# Patient Record
Sex: MALE | Employment: OTHER
[De-identification: names, ages, dates, MRNs, and addresses within clinical notes are randomized per-mention and may not be internally consistent; named-entity substitution may affect disease eponyms.]

---

## 2020-11-01 ENCOUNTER — OFFICE VISIT (OUTPATIENT)
Age: 75
End: 2020-11-01

## 2021-04-08 ENCOUNTER — OFFICE VISIT (OUTPATIENT)
Age: 76
End: 2021-04-08

## 2021-04-29 ENCOUNTER — TELEPHONE ENCOUNTER (OUTPATIENT)
Dept: URBAN - METROPOLITAN AREA CLINIC 9 | Facility: CLINIC | Age: 76
End: 2021-04-29

## 2021-04-29 ENCOUNTER — OFFICE VISIT (OUTPATIENT)
Dept: URBAN - METROPOLITAN AREA CLINIC 7 | Facility: CLINIC | Age: 76
End: 2021-04-29

## 2021-04-30 ENCOUNTER — OFFICE VISIT (OUTPATIENT)
Dept: URBAN - METROPOLITAN AREA SURGERY CENTER 5 | Facility: SURGERY CENTER | Age: 76
End: 2021-04-30

## 2021-05-03 ENCOUNTER — TELEPHONE ENCOUNTER (OUTPATIENT)
Dept: URBAN - METROPOLITAN AREA CLINIC 9 | Facility: CLINIC | Age: 76
End: 2021-05-03

## 2021-05-07 ENCOUNTER — OFFICE VISIT (OUTPATIENT)
Dept: URBAN - METROPOLITAN AREA SURGERY CENTER 5 | Facility: SURGERY CENTER | Age: 76
End: 2021-05-07

## 2021-06-02 ENCOUNTER — OFFICE VISIT (OUTPATIENT)
Dept: URBAN - METROPOLITAN AREA SURGERY CENTER 5 | Facility: SURGERY CENTER | Age: 76
End: 2021-06-02

## 2022-07-30 ENCOUNTER — TELEPHONE ENCOUNTER (OUTPATIENT)
Age: 77
End: 2022-07-30

## 2022-07-30 RX ORDER — SODIUM PHOSPHATE,MONO-DIBASIC 19G-7G/118
1 (ONE) ENEMA (ML) RECTAL DAILY
Qty: 0 | Refills: 2 | OUTPATIENT
Start: 2017-12-04 | End: 2017-12-05

## 2022-07-30 RX ORDER — DICYCLOMINE HYDROCHLORIDE 10 MG/1
1-2 CAPSULE CAPSULE ORAL
Qty: 0 | Refills: 16 | OUTPATIENT
Start: 2014-01-20 | End: 2014-12-02

## 2022-07-31 ENCOUNTER — TELEPHONE ENCOUNTER (OUTPATIENT)
Age: 77
End: 2022-07-31

## 2022-07-31 RX ORDER — DICYCLOMINE HYDROCHLORIDE 10 MG/1
1-2 CAPSULE ORAL
Qty: 0 | Refills: 16 | Status: ACTIVE | COMMUNITY
Start: 2014-01-20

## 2022-07-31 RX ORDER — SODIUM PHOSPHATE,MONO-DIBASIC 19G-7G/118
1 (ONE) ENEMA (ML) RECTAL DAILY
Qty: 0 | Refills: 2 | Status: ACTIVE | COMMUNITY
Start: 2017-12-04

## 2023-01-13 ENCOUNTER — OFFICE VISIT (OUTPATIENT)
Dept: URBAN - METROPOLITAN AREA CLINIC 7 | Facility: CLINIC | Age: 78
End: 2023-01-13
Payer: MEDICARE

## 2023-01-13 ENCOUNTER — WEB ENCOUNTER (OUTPATIENT)
Dept: URBAN - METROPOLITAN AREA CLINIC 7 | Facility: CLINIC | Age: 78
End: 2023-01-13

## 2023-01-13 ENCOUNTER — LAB OUTSIDE AN ENCOUNTER (OUTPATIENT)
Dept: URBAN - METROPOLITAN AREA CLINIC 7 | Facility: CLINIC | Age: 78
End: 2023-01-13

## 2023-01-13 VITALS
DIASTOLIC BLOOD PRESSURE: 72 MMHG | WEIGHT: 165 LBS | TEMPERATURE: 98.7 F | HEIGHT: 71 IN | SYSTOLIC BLOOD PRESSURE: 144 MMHG | BODY MASS INDEX: 23.1 KG/M2

## 2023-01-13 DIAGNOSIS — K58.9 IRRITABLE BOWEL SYNDROME: ICD-10-CM

## 2023-01-13 DIAGNOSIS — R10.13 EPIGASTRIC PAIN: ICD-10-CM

## 2023-01-13 DIAGNOSIS — K21.9 GASTROESOPHAGEAL REFLUX DISEASE: ICD-10-CM

## 2023-01-13 DIAGNOSIS — K57.92 DIVERTICULITIS: ICD-10-CM

## 2023-01-13 DIAGNOSIS — Z86.010 PERSONAL HISTORY OF COLONIC POLYPS: ICD-10-CM

## 2023-01-13 DIAGNOSIS — Z87.11 H/O PEPTIC ULCER: ICD-10-CM

## 2023-01-13 PROCEDURE — 99214 OFFICE O/P EST MOD 30 MIN: CPT | Performed by: STUDENT IN AN ORGANIZED HEALTH CARE EDUCATION/TRAINING PROGRAM

## 2023-01-13 RX ORDER — DICYCLOMINE HYDROCHLORIDE 10 MG/1
1-2 CAPSULE ORAL
Qty: 0 | Refills: 16 | Status: DISCONTINUED | COMMUNITY
Start: 2014-01-20

## 2023-01-13 RX ORDER — SODIUM PHOSPHATE,MONO-DIBASIC 19G-7G/118
1 (ONE) ENEMA (ML) RECTAL DAILY
Qty: 0 | Refills: 2 | Status: DISCONTINUED | COMMUNITY
Start: 2017-12-04

## 2023-01-13 RX ORDER — PANTOPRAZOLE SODIUM 40 MG/1
1 TABLET TABLET, DELAYED RELEASE ORAL ONCE A DAY
Status: ACTIVE | COMMUNITY

## 2023-01-13 NOTE — HPI-TODAY'S VISIT:
76 YO Male presents for chronic acid reflux.  Has bene going on for 3-4 weeks, not responsive to PPI.  Has been having esophageal pain, occasional dysphagia.  Previous history of colon resection due to perforated colon from diverticulosis, s/p reversal.  Will schedule EGD with possible dilation for epigastric pain and dysphagia.    ALARM SYMPTOMS --No odynophagia --No hematemesis --No melena / hematochezia --No unintentional weight loss --No iron deficiency anemia  PERTINENT MEDICAL HISTORY Aspirin 81mg PO daily:   --Not Taking Other Blood Thinners:   --Not Taking Diabetes Medication:   --No History  Cardiac Disease:   --No History  Pulmonary Disease --No History  Abdominal Surgery & Hernia:  Colon resection, perforated diverticulum  PERTINENT GI FAMILY HISTORY Colon polyps:  no family history Colon cancer:  no family history   GASTROINTESTINAL PROCEDURE HISTORY Colonoscopy:	 --2021  EGD:   --2021

## 2023-01-17 PROBLEM — 79922009: Status: ACTIVE | Noted: 2023-01-13

## 2023-01-20 ENCOUNTER — CLAIMS CREATED FROM THE CLAIM WINDOW (OUTPATIENT)
Dept: URBAN - METROPOLITAN AREA SURGERY CENTER 5 | Facility: SURGERY CENTER | Age: 78
End: 2023-01-20
Payer: MEDICARE

## 2023-01-20 ENCOUNTER — CLAIMS CREATED FROM THE CLAIM WINDOW (OUTPATIENT)
Dept: URBAN - METROPOLITAN AREA CLINIC 4 | Facility: CLINIC | Age: 78
End: 2023-01-20
Payer: MEDICARE

## 2023-01-20 DIAGNOSIS — R10.13 ABDOMINAL DISCOMFORT, EPIGASTRIC: ICD-10-CM

## 2023-01-20 DIAGNOSIS — K29.70 CHRONIC ACITVE GASTRITIS (H.PYLORI NEGATIVE): ICD-10-CM

## 2023-01-20 DIAGNOSIS — R13.10 ABNORMAL DEGLUTITION: ICD-10-CM

## 2023-01-20 DIAGNOSIS — K22.2 ACQUIRED ESOPHAGEAL RING: ICD-10-CM

## 2023-01-20 DIAGNOSIS — K20.90 ACUTE ESOPHAGITIS: ICD-10-CM

## 2023-01-20 DIAGNOSIS — K31.89 OTHER DISEASES OF STOMACH AND DUODENUM: ICD-10-CM

## 2023-01-20 PROCEDURE — 43248 EGD GUIDE WIRE INSERTION: CPT | Performed by: STUDENT IN AN ORGANIZED HEALTH CARE EDUCATION/TRAINING PROGRAM

## 2023-01-20 PROCEDURE — 43239 EGD BIOPSY SINGLE/MULTIPLE: CPT | Performed by: STUDENT IN AN ORGANIZED HEALTH CARE EDUCATION/TRAINING PROGRAM

## 2023-01-20 PROCEDURE — 88305 TISSUE EXAM BY PATHOLOGIST: CPT | Performed by: PATHOLOGY

## 2023-01-20 RX ORDER — PANTOPRAZOLE SODIUM 40 MG/1
1 TABLET TABLET, DELAYED RELEASE ORAL ONCE A DAY
Status: ACTIVE | COMMUNITY

## 2023-02-01 ENCOUNTER — TELEPHONE ENCOUNTER (OUTPATIENT)
Dept: URBAN - METROPOLITAN AREA CLINIC 7 | Facility: CLINIC | Age: 78
End: 2023-02-01

## 2023-02-01 RX ORDER — FAMOTIDINE 40 MG/1
1 TABLET AT BEDTIME TABLET, FILM COATED ORAL ONCE A DAY
Qty: 30 | Refills: 3 | OUTPATIENT

## 2023-02-01 RX ORDER — PANTOPRAZOLE SODIUM 40 MG/1
1 TABLET TABLET, DELAYED RELEASE ORAL ONCE A DAY
Status: ACTIVE | COMMUNITY

## 2023-02-27 ENCOUNTER — TELEPHONE ENCOUNTER (OUTPATIENT)
Dept: URBAN - METROPOLITAN AREA CLINIC 9 | Facility: CLINIC | Age: 78
End: 2023-02-27

## 2023-02-27 RX ORDER — PANTOPRAZOLE SODIUM 40 MG/1
1 TABLET TABLET, DELAYED RELEASE ORAL ONCE A DAY
Qty: 30 | Refills: 3

## 2023-03-06 ENCOUNTER — OFFICE VISIT (OUTPATIENT)
Dept: URBAN - METROPOLITAN AREA CLINIC 7 | Facility: CLINIC | Age: 78
End: 2023-03-06
Payer: MEDICARE

## 2023-03-06 VITALS
HEIGHT: 71 IN | TEMPERATURE: 98 F | BODY MASS INDEX: 22.96 KG/M2 | DIASTOLIC BLOOD PRESSURE: 82 MMHG | WEIGHT: 164 LBS | SYSTOLIC BLOOD PRESSURE: 135 MMHG

## 2023-03-06 DIAGNOSIS — K57.92 DIVERTICULITIS: ICD-10-CM

## 2023-03-06 DIAGNOSIS — K21.9 CHRONIC GERD: ICD-10-CM

## 2023-03-06 DIAGNOSIS — K58.9 IRRITABLE BOWEL SYNDROME: ICD-10-CM

## 2023-03-06 DIAGNOSIS — Z86.010 HISTORY OF COLONIC POLYPS: ICD-10-CM

## 2023-03-06 DIAGNOSIS — R68.89 EXCESSIVE ORAL SECRETIONS: ICD-10-CM

## 2023-03-06 DIAGNOSIS — R13.19 ESOPHAGEAL DYSPHAGIA: ICD-10-CM

## 2023-03-06 PROBLEM — 53827007: Status: ACTIVE | Noted: 2023-03-06

## 2023-03-06 PROBLEM — 40890009: Status: ACTIVE | Noted: 2023-03-06

## 2023-03-06 PROBLEM — 235595009: Status: ACTIVE | Noted: 2023-02-01

## 2023-03-06 PROCEDURE — 99214 OFFICE O/P EST MOD 30 MIN: CPT | Performed by: STUDENT IN AN ORGANIZED HEALTH CARE EDUCATION/TRAINING PROGRAM

## 2023-03-06 RX ORDER — FAMOTIDINE 40 MG/1
1 TABLET AT BEDTIME TABLET, FILM COATED ORAL ONCE A DAY
Qty: 30 | Refills: 3 | Status: ACTIVE | COMMUNITY

## 2023-03-06 RX ORDER — PANTOPRAZOLE SODIUM 40 MG/1
1 TABLET TABLET, DELAYED RELEASE ORAL TWICE A DAY
Qty: 60 | Refills: 0 | OUTPATIENT

## 2023-03-06 RX ORDER — PANTOPRAZOLE SODIUM 40 MG/1
1 TABLET TABLET, DELAYED RELEASE ORAL ONCE A DAY
Qty: 30 | Refills: 3 | Status: ACTIVE | COMMUNITY

## 2023-03-06 NOTE — HPI-TODAY'S VISIT:
78 YO Male presents for chronic acid reflux.  Has bene going on for 3-4 weeks, not responsive to PPI.  Has been having esophageal pain, occasional dysphagia.  Previous history of colon resection due to perforated colon from diverticulosis, s/p reversal.  Will schedule EGD with possible dilation for epigastric pain and dysphagia.     3/6/23: s/p EGD with evidence of esophagitis and gastritis.   Occasional break through GERD symptoms.  And increased oral secretion.  Advised patient for trial of Mucinex.  Will try 1 month of PPI BID.    PERTINENT MEDICAL HISTORY Aspirin 81mg PO daily:   --Not Taking Other Blood Thinners:   --Not Taking Diabetes Medication:   --No History  Cardiac Disease:   --No History  Pulmonary Disease --No History  Abdominal Surgery & Hernia:  Colon resection, perforated diverticulum  PERTINENT GI FAMILY HISTORY Colon polyps:  no family history Colon cancer:  no family history   GASTROINTESTINAL PROCEDURE HISTORY Colonoscopy:	 --2021  EGD:   --2021

## 2023-04-03 ENCOUNTER — OFFICE VISIT (OUTPATIENT)
Dept: URBAN - METROPOLITAN AREA CLINIC 7 | Facility: CLINIC | Age: 78
End: 2023-04-03
Payer: MEDICARE

## 2023-04-03 VITALS
HEIGHT: 71 IN | SYSTOLIC BLOOD PRESSURE: 130 MMHG | TEMPERATURE: 97.8 F | DIASTOLIC BLOOD PRESSURE: 76 MMHG | WEIGHT: 165 LBS | BODY MASS INDEX: 23.1 KG/M2

## 2023-04-03 DIAGNOSIS — Z87.11 H/O PEPTIC ULCER: ICD-10-CM

## 2023-04-03 DIAGNOSIS — K21.9 CHRONIC GERD: ICD-10-CM

## 2023-04-03 DIAGNOSIS — Z86.010 PERSONAL HISTORY OF COLONIC POLYPS: ICD-10-CM

## 2023-04-03 DIAGNOSIS — K57.92 DIVERTICULITIS: ICD-10-CM

## 2023-04-03 DIAGNOSIS — R10.13 EPIGASTRIC PAIN: ICD-10-CM

## 2023-04-03 PROCEDURE — 99214 OFFICE O/P EST MOD 30 MIN: CPT | Performed by: STUDENT IN AN ORGANIZED HEALTH CARE EDUCATION/TRAINING PROGRAM

## 2023-04-03 RX ORDER — PANTOPRAZOLE SODIUM 40 MG/1
1 TABLET TABLET, DELAYED RELEASE ORAL ONCE A DAY
Qty: 30 | Refills: 3 | Status: ACTIVE | COMMUNITY

## 2023-04-03 RX ORDER — PANTOPRAZOLE SODIUM 40 MG/1
1 TABLET TABLET, DELAYED RELEASE ORAL TWICE A DAY
Qty: 60 | Refills: 0 | Status: ON HOLD | COMMUNITY

## 2023-04-03 RX ORDER — FAMOTIDINE 40 MG/1
1 TABLET AT BEDTIME TABLET, FILM COATED ORAL ONCE A DAY
Qty: 30 | Refills: 3 | Status: ACTIVE | COMMUNITY

## 2023-04-03 RX ORDER — SUCRALFATE 1 G/10ML
10 ML ON AN EMPTY STOMACH SUSPENSION ORAL TWICE A DAY
Qty: 600 | OUTPATIENT
Start: 2023-04-03 | End: 2023-05-03

## 2023-04-03 NOTE — HPI-TODAY'S VISIT:
78 YO Male presents for chronic acid reflux.  Has bene going on for 3-4 weeks, not responsive to PPI.  Has been having esophageal pain, occasional dysphagia.  Previous history of colon resection due to perforated colon from diverticulosis, s/p reversal.  Will schedule EGD with possible dilation for epigastric pain and dysphagia.     3/6/23: s/p EGD with evidence of esophagitis and gastritis.   Occasional break through GERD symptoms.  And increased oral secretion.  Advised patient for trial of Mucinex.  Will try 1 month of PPI BID.     4/3/23: PResents for follow up with increased phelgm and is lizzeth to cough it up in AM and feels better the rest of the day.  HAs occasional GERD symptoms after drinking coffee.  States he is eating earlier in the evening.  PAtietn states that he was not able to try the Mucinex.  Advised aptient for trial of Mucinex and trial of Carafate.  RTC in 2-3 months.  ADvised aptient to follow up with PCP regarding possible CXR  =for bronchiectasis.    PERTINENT MEDICAL HISTORY Aspirin 81mg PO daily:   --Not Taking Other Blood Thinners:   --Not Taking Diabetes Medication:   --No History  Cardiac Disease:   --No History  Pulmonary Disease --No History  Abdominal Surgery & Hernia:  Colon resection, perforated diverticulum  PERTINENT GI FAMILY HISTORY Colon polyps:  no family history Colon cancer:  no family history   GASTROINTESTINAL PROCEDURE HISTORY Colonoscopy:	 --2021  EGD:   --2021, 2023

## 2023-05-18 ENCOUNTER — TELEPHONE ENCOUNTER (OUTPATIENT)
Dept: URBAN - METROPOLITAN AREA CLINIC 7 | Facility: CLINIC | Age: 78
End: 2023-05-18

## 2023-05-18 RX ORDER — FAMOTIDINE 40 MG/1
1 TABLET AT BEDTIME TABLET, FILM COATED ORAL ONCE A DAY
Qty: 30 | Refills: 3

## 2023-05-31 ENCOUNTER — OFFICE VISIT (OUTPATIENT)
Dept: URBAN - METROPOLITAN AREA CLINIC 7 | Facility: CLINIC | Age: 78
End: 2023-05-31
Payer: MEDICARE

## 2023-05-31 VITALS
DIASTOLIC BLOOD PRESSURE: 70 MMHG | BODY MASS INDEX: 23.1 KG/M2 | WEIGHT: 165 LBS | SYSTOLIC BLOOD PRESSURE: 124 MMHG | HEIGHT: 71 IN | TEMPERATURE: 97.9 F

## 2023-05-31 DIAGNOSIS — Z86.010 PERSONAL HISTORY OF COLONIC POLYPS: ICD-10-CM

## 2023-05-31 DIAGNOSIS — R68.89 EXCESSIVE ORAL SECRETIONS: ICD-10-CM

## 2023-05-31 DIAGNOSIS — K57.92 DIVERTICULITIS: ICD-10-CM

## 2023-05-31 DIAGNOSIS — Z87.11 H/O PEPTIC ULCER: ICD-10-CM

## 2023-05-31 DIAGNOSIS — K21.9 CHRONIC GERD: ICD-10-CM

## 2023-05-31 PROCEDURE — 99214 OFFICE O/P EST MOD 30 MIN: CPT | Performed by: STUDENT IN AN ORGANIZED HEALTH CARE EDUCATION/TRAINING PROGRAM

## 2023-05-31 RX ORDER — PANTOPRAZOLE SODIUM 40 MG/1
1 TABLET TABLET, DELAYED RELEASE ORAL TWICE A DAY
Qty: 60 | Refills: 0 | Status: DISCONTINUED | COMMUNITY

## 2023-05-31 RX ORDER — PANTOPRAZOLE SODIUM 40 MG/1
1 TABLET TABLET, DELAYED RELEASE ORAL ONCE A DAY
Qty: 30 | Refills: 3 | Status: ACTIVE | COMMUNITY

## 2023-05-31 RX ORDER — FAMOTIDINE 40 MG/1
1 TABLET AT BEDTIME TABLET, FILM COATED ORAL ONCE A DAY
Qty: 30 | Refills: 3 | Status: ACTIVE | COMMUNITY

## 2023-05-31 NOTE — HPI-TODAY'S VISIT:
76 YO Male presents for chronic acid reflux.  Has bene going on for 3-4 weeks, not responsive to PPI.  Has been having esophageal pain, occasional dysphagia.  Previous history of colon resection due to perforated colon from diverticulosis, s/p reversal.  Will schedule EGD with possible dilation for epigastric pain and dysphagia.     3/6/23: s/p EGD with evidence of esophagitis and gastritis.   Occasional break through GERD symptoms.  And increased oral secretion.  Advised patient for trial of Mucinex.  Will try 1 month of PPI BID.    IH 4/3/23: PResents for follow up with increased phelgm and is lizzeth to cough it up in AM and feels better the rest of the day.  HAs occasional GERD symptoms after drinking coffee.  States he is eating earlier in the evening.  PAtietn states that he was not able to try the Mucinex.  Advised aptient for trial of Mucinex and trial of Carafate.  RTC in 2-3 months.  ADvised aptient to follow up with PCP regarding possible CXR  =for bronchiectasis.    IH 5/31/23: Feeling much better on PPI BID and H2RA qbedtime.  Colonoscopy needs to be done in Jan 2024.  GERD LSM reviewed with patient.  WIll continue curent acid reduction therapy for 3 more months and then start to taper back medication.  PERTINENT MEDICAL HISTORY Aspirin 81mg PO daily:   --Not Taking Other Blood Thinners:   --Not Taking Diabetes Medication:   --No History  Cardiac Disease:   --No History  Pulmonary Disease --No History  Abdominal Surgery & Hernia:  Colon resection, perforated diverticulum  PERTINENT GI FAMILY HISTORY Colon polyps:  no family history Colon cancer:  no family history   GASTROINTESTINAL PROCEDURE HISTORY Colonoscopy:	 --2021  EGD:   --2021, 2023

## 2023-06-13 ENCOUNTER — TELEPHONE ENCOUNTER (OUTPATIENT)
Dept: URBAN - METROPOLITAN AREA CLINIC 7 | Facility: CLINIC | Age: 78
End: 2023-06-13

## 2023-06-13 RX ORDER — PANTOPRAZOLE SODIUM 40 MG/1
1 TABLET TABLET, DELAYED RELEASE ORAL ONCE A DAY
Qty: 90 TABLET | Refills: 3

## 2023-09-20 ENCOUNTER — TELEPHONE ENCOUNTER (OUTPATIENT)
Dept: URBAN - METROPOLITAN AREA CLINIC 7 | Facility: CLINIC | Age: 78
End: 2023-09-20

## 2023-09-20 ENCOUNTER — ERX REFILL RESPONSE (OUTPATIENT)
Dept: URBAN - METROPOLITAN AREA CLINIC 7 | Facility: CLINIC | Age: 78
End: 2023-09-20

## 2023-09-20 RX ORDER — FAMOTIDINE 40 MG/1
1 TABLET AT BEDTIME TABLET, FILM COATED ORAL ONCE A DAY
Qty: 90 | Refills: 0 | OUTPATIENT

## 2023-09-20 RX ORDER — FAMOTIDINE 40 MG/1
1 TABLET AT BEDTIME TABLET, FILM COATED ORAL ONCE A DAY
Qty: 90 | Refills: 0

## 2024-01-05 ENCOUNTER — TELEPHONE ENCOUNTER (OUTPATIENT)
Dept: URBAN - METROPOLITAN AREA CLINIC 7 | Facility: CLINIC | Age: 79
End: 2024-01-05

## 2024-01-05 ENCOUNTER — OFFICE VISIT (OUTPATIENT)
Dept: URBAN - METROPOLITAN AREA CLINIC 7 | Facility: CLINIC | Age: 79
End: 2024-01-05
Payer: MEDICARE

## 2024-01-05 ENCOUNTER — LAB OUTSIDE AN ENCOUNTER (OUTPATIENT)
Dept: URBAN - METROPOLITAN AREA CLINIC 7 | Facility: CLINIC | Age: 79
End: 2024-01-05

## 2024-01-05 VITALS — TEMPERATURE: 97.7 F | WEIGHT: 168 LBS | HEIGHT: 71 IN | BODY MASS INDEX: 23.52 KG/M2

## 2024-01-05 DIAGNOSIS — Z86.010 PERSONAL HISTORY OF COLONIC POLYPS: ICD-10-CM

## 2024-01-05 DIAGNOSIS — K21.9 GASTROESOPHAGEAL REFLUX DISEASE: ICD-10-CM

## 2024-01-05 DIAGNOSIS — Z87.11 H/O PEPTIC ULCER: ICD-10-CM

## 2024-01-05 PROCEDURE — 99213 OFFICE O/P EST LOW 20 MIN: CPT | Performed by: STUDENT IN AN ORGANIZED HEALTH CARE EDUCATION/TRAINING PROGRAM

## 2024-01-05 RX ORDER — FAMOTIDINE 40 MG/1
1 TABLET AT BEDTIME TABLET, FILM COATED ORAL ONCE A DAY
Qty: 90 | Refills: 3

## 2024-01-05 RX ORDER — PANTOPRAZOLE SODIUM 40 MG/1
1 TABLET TABLET, DELAYED RELEASE ORAL ONCE A DAY
Qty: 90 TABLET | Refills: 3 | Status: ACTIVE | COMMUNITY

## 2024-01-05 RX ORDER — FAMOTIDINE 40 MG/1
1 TABLET AT BEDTIME TABLET, FILM COATED ORAL ONCE A DAY
Qty: 90 | Refills: 0 | Status: ACTIVE | COMMUNITY

## 2024-01-05 NOTE — HPI-TODAY'S VISIT:
78 YO Male presents for chronic acid reflux.  Has bene going on for 3-4 weeks, not responsive to PPI.  Has been having esophageal pain, occasional dysphagia.  Previous history of colon resection due to perforated colon from diverticulosis, s/p reversal.  Will schedule EGD with possible dilation for epigastric pain and dysphagia.     3/6/23: s/p EGD with evidence of esophagitis and gastritis.   Occasional break through GERD symptoms.  And increased oral secretion.  Advised patient for trial of Mucinex.  Will try 1 month of PPI BID.    IH 4/3/23: PResents for follow up with increased phelgm and is lizzeth to cough it up in AM and feels better the rest of the day.  HAs occasional GERD symptoms after drinking coffee.  States he is eating earlier in the evening.  PAtietn states that he was not able to try the Mucinex.  Advised aptient for trial of Mucinex and trial of Carafate.  RTC in 2-3 months.  ADvised aptient to follow up with PCP regarding possible CXR  =for bronchiectasis.    IH 5/31/23: Feeling much better on PPI BID and H2RA qbedtime.  Colonoscopy needs to be done in Jan 2024.  GERD LSM reviewed with patient.  WIll continue curent acid reduction therapy for 3 more months and then start to taper back medication.  IH 1/5/23: presents for chronic GERD.   States he gets symptoms after drinking coffee every morning but doesn't want to stop drinking coffee.  Has not attempted a food journal.  Would like to try reduction in coffee, and he states that he drinks 4-6.  Has a personal hsiotry of colon polyps - patient requests one more surveillance colonoscopy.  Will revaluate GERD symptoms with EGD at same time.  We discussed possibility of BRAVO testing but patient would like to try reduction in coffee/caffeine intake prior to further testing with BRAVO.  PERTINENT MEDICAL HISTORY Aspirin 81mg PO daily:   --Not Taking Other Blood Thinners:   --Not Taking Diabetes Medication:   --No History  Cardiac Disease:   --No History  Pulmonary Disease --No History  Abdominal Surgery & Hernia:  Colon resection, perforated diverticulum  PERTINENT GI FAMILY HISTORY Colon polyps:  no family history Colon cancer:  no family history   GASTROINTESTINAL PROCEDURE HISTORY Colonoscopy:	 --2021  EGD:   --2021, 2023

## 2024-03-01 ENCOUNTER — COL/EGD (OUTPATIENT)
Dept: URBAN - METROPOLITAN AREA SURGERY CENTER 5 | Facility: SURGERY CENTER | Age: 79
End: 2024-03-01

## 2024-03-01 ENCOUNTER — LAB (OUTPATIENT)
Dept: URBAN - METROPOLITAN AREA CLINIC 4 | Facility: CLINIC | Age: 79
End: 2024-03-01
Payer: MEDICARE

## 2024-03-01 ENCOUNTER — COLON (OUTPATIENT)
Dept: URBAN - METROPOLITAN AREA SURGERY CENTER 5 | Facility: SURGERY CENTER | Age: 79
End: 2024-03-01
Payer: MEDICARE

## 2024-03-01 DIAGNOSIS — K57.30 DIVERTICULOSIS OF LARGE INTESTINE WITHOUT PERFORATION OR ABSCESS WITHOUT BLEEDING: ICD-10-CM

## 2024-03-01 DIAGNOSIS — Z98.0 INTESTINAL BYPASS AND ANASTOMOSIS STATUS: ICD-10-CM

## 2024-03-01 DIAGNOSIS — K62.1 RECTAL POLYP: ICD-10-CM

## 2024-03-01 DIAGNOSIS — D12.0 BENIGN NEOPLASM OF CECUM: ICD-10-CM

## 2024-03-01 DIAGNOSIS — Z86.010 PERSONAL HISTORY OF COLONIC POLYPS: ICD-10-CM

## 2024-03-01 DIAGNOSIS — K64.8 OTHER HEMORRHOIDS: ICD-10-CM

## 2024-03-01 DIAGNOSIS — K63.5 POLYP OF ASCENDING COLON, UNSPECIFIED TYPE: ICD-10-CM

## 2024-03-01 PROCEDURE — 88305 TISSUE EXAM BY PATHOLOGIST: CPT | Performed by: PATHOLOGY

## 2024-03-01 PROCEDURE — 45385 COLONOSCOPY W/LESION REMOVAL: CPT | Performed by: INTERNAL MEDICINE

## 2024-03-01 RX ORDER — FAMOTIDINE 40 MG/1
1 TABLET AT BEDTIME TABLET, FILM COATED ORAL ONCE A DAY
Qty: 90 | Refills: 3 | Status: ACTIVE | COMMUNITY

## 2024-03-01 RX ORDER — PANTOPRAZOLE SODIUM 40 MG/1
1 TABLET TABLET, DELAYED RELEASE ORAL ONCE A DAY
Qty: 90 TABLET | Refills: 3 | Status: ACTIVE | COMMUNITY

## 2024-04-15 ENCOUNTER — OV EP (OUTPATIENT)
Dept: URBAN - METROPOLITAN AREA CLINIC 7 | Facility: CLINIC | Age: 79
End: 2024-04-15

## 2024-04-15 RX ORDER — FAMOTIDINE 40 MG/1
1 TABLET AT BEDTIME TABLET, FILM COATED ORAL ONCE A DAY
Qty: 90 | Refills: 3 | Status: ACTIVE | COMMUNITY

## 2024-04-15 RX ORDER — PANTOPRAZOLE SODIUM 40 MG/1
1 TABLET TABLET, DELAYED RELEASE ORAL ONCE A DAY
Qty: 90 TABLET | Refills: 3 | Status: ACTIVE | COMMUNITY

## 2024-06-21 ENCOUNTER — DASHBOARD ENCOUNTERS (OUTPATIENT)
Age: 79
End: 2024-06-21

## 2024-06-21 ENCOUNTER — OFFICE VISIT (OUTPATIENT)
Dept: URBAN - METROPOLITAN AREA CLINIC 7 | Facility: CLINIC | Age: 79
End: 2024-06-21
Payer: MEDICARE

## 2024-06-21 VITALS
DIASTOLIC BLOOD PRESSURE: 70 MMHG | SYSTOLIC BLOOD PRESSURE: 130 MMHG | BODY MASS INDEX: 23.24 KG/M2 | HEIGHT: 71 IN | TEMPERATURE: 97.9 F | WEIGHT: 166 LBS | RESPIRATION RATE: 16 BRPM

## 2024-06-21 DIAGNOSIS — Z87.11 H/O PEPTIC ULCER: ICD-10-CM

## 2024-06-21 DIAGNOSIS — Z86.010 PERSONAL HISTORY OF COLONIC POLYPS: ICD-10-CM

## 2024-06-21 DIAGNOSIS — K21.9 GASTROESOPHAGEAL REFLUX DISEASE: ICD-10-CM

## 2024-06-21 PROCEDURE — 99213 OFFICE O/P EST LOW 20 MIN: CPT | Performed by: INTERNAL MEDICINE

## 2024-06-21 RX ORDER — PANTOPRAZOLE SODIUM 40 MG/1
1 TABLET TABLET, DELAYED RELEASE ORAL ONCE A DAY
Qty: 90 TABLET | Refills: 3 | Status: ACTIVE | COMMUNITY

## 2024-06-21 RX ORDER — FAMOTIDINE 40 MG/1
1 TABLET AT BEDTIME TABLET, FILM COATED ORAL ONCE A DAY
Qty: 90 | Refills: 3 | Status: ACTIVE | COMMUNITY

## 2024-06-21 NOTE — HPI-TODAY'S VISIT:
Patient was last seen in the office in January 2024 and is new to me.  Patient has been previously evaluated for symptoms of chronic reflux and acidic symptoms.  He gets symptoms after drinking coffee every morning but has not wanted to curtail his coffee use.  He drinks 4 to 6 cups of coffee per day.  He does have a personal history of colonic polyps and I requested a surveillance colonoscopy.  He also requested an EGD at the same time but reduction in coffee intake was suggested prior to doing an EGD with [facility].  Colonoscopy March 2024 which I performed demonstrated 14 colonic polyps with a patent end-to-end colocolonic anastomosis at 15 cm proximal to the dentate line, mild diverticulosis, small nonbleeding internal hemorrhoids with pathology demonstrating at least 11 tubular adenomas.  Last EGD was done in January 2023 which demonstrated a benign-appearing esophageal stenosis which was dilated, LA grade a esophagitis, gastritis, and normal duodenum with biopsies negative for H. pylori and negative for Lugo's esophagus.  Labs more recently in June 2024 demonstrated a creatinine 0.2, normal LFTs, A1c 5.4%, and normal hemoglobin of 13.2.  Abdominal ultrasound June 2022 demonstrated fatty liver and status post cholecystectomy.  After discussion with the patient at the time of his colonoscopy he had deferred an upper endoscopy.  Follow-up now.  Has had issues with chronic reflux, acid issues for 30+ years. Last EGD 1/2023. Takes pantoprazole in the AM, famotidine at night. This regimen has helped him as far as controlling symptoms. Not having severe pain or symptoms. No dysphagia, no vomiting, no alarm symptoms.

## 2024-07-31 ENCOUNTER — TELEPHONE ENCOUNTER (OUTPATIENT)
Dept: URBAN - METROPOLITAN AREA CLINIC 7 | Facility: CLINIC | Age: 79
End: 2024-07-31

## 2024-07-31 RX ORDER — PANTOPRAZOLE SODIUM 40 MG/1
1 TABLET TABLET, DELAYED RELEASE ORAL ONCE A DAY
Qty: 90 TABLET | Refills: 3

## 2024-08-02 ENCOUNTER — TELEPHONE ENCOUNTER (OUTPATIENT)
Dept: URBAN - METROPOLITAN AREA CLINIC 7 | Facility: CLINIC | Age: 79
End: 2024-08-02

## 2025-02-20 ENCOUNTER — TELEPHONE ENCOUNTER (OUTPATIENT)
Dept: URBAN - METROPOLITAN AREA CLINIC 7 | Facility: CLINIC | Age: 80
End: 2025-02-20

## 2025-02-20 ENCOUNTER — LAB OUTSIDE AN ENCOUNTER (OUTPATIENT)
Dept: URBAN - METROPOLITAN AREA CLINIC 7 | Facility: CLINIC | Age: 80
End: 2025-02-20

## 2025-02-20 ENCOUNTER — OFFICE VISIT (OUTPATIENT)
Dept: URBAN - METROPOLITAN AREA CLINIC 7 | Facility: CLINIC | Age: 80
End: 2025-02-20
Payer: MEDICARE

## 2025-02-20 VITALS
HEIGHT: 71 IN | DIASTOLIC BLOOD PRESSURE: 74 MMHG | BODY MASS INDEX: 24.08 KG/M2 | TEMPERATURE: 97.6 F | WEIGHT: 172 LBS | SYSTOLIC BLOOD PRESSURE: 134 MMHG

## 2025-02-20 DIAGNOSIS — Z87.11 H/O PEPTIC ULCER: ICD-10-CM

## 2025-02-20 DIAGNOSIS — K21.9 GASTROESOPHAGEAL REFLUX DISEASE: ICD-10-CM

## 2025-02-20 DIAGNOSIS — Z86.0100 PERSONAL HISTORY OF COLONIC POLYPS: ICD-10-CM

## 2025-02-20 PROBLEM — 428283002: Status: ACTIVE | Noted: 2025-02-20

## 2025-02-20 PROCEDURE — 99214 OFFICE O/P EST MOD 30 MIN: CPT | Performed by: INTERNAL MEDICINE

## 2025-02-20 RX ORDER — PANTOPRAZOLE SODIUM 40 MG/1
1 TABLET TABLET, DELAYED RELEASE ORAL ONCE A DAY
Qty: 90 TABLET | Refills: 3 | Status: ACTIVE | COMMUNITY

## 2025-02-20 RX ORDER — FAMOTIDINE 40 MG/1
1 TABLET AT BEDTIME TABLET, FILM COATED ORAL ONCE A DAY
Qty: 90 | Refills: 3 | Status: ACTIVE | COMMUNITY

## 2025-02-20 RX ORDER — POLYETHYLENE GLYCOL 3350 17 G/DOSE
1 CAPFUL ORALLY AS DIRECTED AS PART OF COLONOSCOPY PREP FOR 1 DAY POWDER (GRAM) ORAL
Qty: 238 GRAMS | Refills: 0 | OUTPATIENT
Start: 2025-02-20 | End: 2025-02-21

## 2025-02-20 NOTE — HPI-TODAY'S VISIT:
LV 6/2024. Patient has been previously evaluated for symptoms of chronic reflux and acidic symptoms.  He gets symptoms after drinking coffee every morning but has not wanted to curtail his coffee use.  He drinks 4 to 6 cups of coffee per day.  He does have a personal history of colonic polyps and I requested a surveillance colonoscopy.  He also requested an EGD at the same time but reduction in coffee intake was suggested prior to doing an EGD with [facility].  Colonoscopy March 2024 which I performed demonstrated 14 colonic polyps with a patent end-to-end colocolonic anastomosis at 15 cm proximal to the dentate line, mild diverticulosis, small nonbleeding internal hemorrhoids with pathology demonstrating at least 11 tubular adenomas.  Last EGD was done in January 2023 which demonstrated a benign-appearing esophageal stenosis which was dilated, LA grade A esophagitis, gastritis, and normal duodenum with biopsies negative for H. pylori and negative for Lugo's esophagus.  Labs more recently in June 2024 demonstrated a creatinine 0.2, normal LFTs, A1c 5.4%, and normal hemoglobin of 13.2.  Abdominal ultrasound June 2022 demonstrated fatty liver and status post cholecystectomy.  After discussion with the patient at the time of his colonoscopy he had deferred an upper endoscopy.  Follow-up now. Has had issues with chronic reflux, acid issues for 30+ years. Last EGD 1/2023. Takes pantoprazole in the AM, famotidine at night. This regimen has helped him as far as controlling symptoms. Not having severe pain or symptoms. No dysphagia, no vomiting, no alarm symptoms. Doing better at this point on current medical regimen, so will continue pantoprazole and famotidine. WIll be due for colon next year given 14 polyps removed on last colon. Tells me he has seen cardiology and no testing ordered. He does not take PPI or H2RA daily, just sometimes as needed at night (maybe couple times per week of the famotidine at night). He does get heartburn, and this is why he takes the med. Today its his most severe day.

## 2025-04-11 ENCOUNTER — TELEPHONE ENCOUNTER (OUTPATIENT)
Dept: URBAN - METROPOLITAN AREA CLINIC 7 | Facility: CLINIC | Age: 80
End: 2025-04-11

## 2025-04-11 ENCOUNTER — CLAIMS CREATED FROM THE CLAIM WINDOW (OUTPATIENT)
Dept: URBAN - METROPOLITAN AREA SURGERY CENTER 5 | Facility: SURGERY CENTER | Age: 80
End: 2025-04-11
Payer: MEDICARE

## 2025-04-11 ENCOUNTER — CLAIMS CREATED FROM THE CLAIM WINDOW (OUTPATIENT)
Dept: URBAN - METROPOLITAN AREA CLINIC 4 | Facility: CLINIC | Age: 80
End: 2025-04-11
Payer: MEDICARE

## 2025-04-11 DIAGNOSIS — K64.8 INTERNAL HEMORRHOIDS: ICD-10-CM

## 2025-04-11 DIAGNOSIS — K44.9 SLIDING HIATAL HERNIA: ICD-10-CM

## 2025-04-11 DIAGNOSIS — Z98.0 INTESTINAL ANASTOMOSIS PRESENT: ICD-10-CM

## 2025-04-11 DIAGNOSIS — R13.10 DYSPHAGIA, UNSPECIFIED TYPE: ICD-10-CM

## 2025-04-11 DIAGNOSIS — K26.9 DUODENAL EROSION: ICD-10-CM

## 2025-04-11 DIAGNOSIS — K22.89 DILATION OF ESOPHAGUS: ICD-10-CM

## 2025-04-11 DIAGNOSIS — Z86.0100 PERSONAL HISTORY OF COLONIC POLYPS: ICD-10-CM

## 2025-04-11 DIAGNOSIS — K63.5 POLYP OF COLON: ICD-10-CM

## 2025-04-11 DIAGNOSIS — K63.5 COLON POLYP: ICD-10-CM

## 2025-04-11 DIAGNOSIS — K21.9 GASTRO-ESOPHAGEAL REFLUX DISEASE WITHOUT ESOPHAGITIS: ICD-10-CM

## 2025-04-11 DIAGNOSIS — D12.2 BENIGN NEOPLASM OF ASCENDING COLON: ICD-10-CM

## 2025-04-11 DIAGNOSIS — K26.9 DUODENAL ULCER, UNSPECIFIED AS ACUTE OR CHRONIC, WITHOUT HEMORRHAGE OR PERFORATION: ICD-10-CM

## 2025-04-11 DIAGNOSIS — K31.89 EROSIVE GASTROPATHY: ICD-10-CM

## 2025-04-11 DIAGNOSIS — Z98.0 INTESTINAL BYPASS AND ANASTOMOSIS STATUS: ICD-10-CM

## 2025-04-11 DIAGNOSIS — Z86.0100 PERSONAL HISTORY OF COLON POLYPS, UNSPECIFIED: ICD-10-CM

## 2025-04-11 DIAGNOSIS — K63.5 POLYP OF ASCENDING COLON, UNSPECIFIED TYPE: ICD-10-CM

## 2025-04-11 DIAGNOSIS — K57.30 DIVERTICULOSIS OF LARGE INTESTINE WITHOUT PERFORATION OR ABSCESS WITHOUT BLEEDING: ICD-10-CM

## 2025-04-11 DIAGNOSIS — K29.70 GASTRITIS, UNSPECIFIED, WITHOUT BLEEDING: ICD-10-CM

## 2025-04-11 DIAGNOSIS — K62.1 POLYP OF RECTUM: ICD-10-CM

## 2025-04-11 DIAGNOSIS — K44.9 DIAPHRAGMATIC HERNIA WITHOUT OBSTRUCTION OR GANGRENE: ICD-10-CM

## 2025-04-11 PROCEDURE — 43239 EGD BIOPSY SINGLE/MULTIPLE: CPT | Performed by: INTERNAL MEDICINE

## 2025-04-11 PROCEDURE — 45385 COLONOSCOPY W/LESION REMOVAL: CPT | Performed by: INTERNAL MEDICINE

## 2025-04-11 PROCEDURE — 88305 TISSUE EXAM BY PATHOLOGIST: CPT | Performed by: PATHOLOGY

## 2025-04-11 PROCEDURE — 00813 ANES UPR LWR GI NDSC PX: CPT | Performed by: NURSE ANESTHETIST, CERTIFIED REGISTERED

## 2025-04-11 PROCEDURE — 43248 EGD GUIDE WIRE INSERTION: CPT | Performed by: INTERNAL MEDICINE

## 2025-04-11 PROCEDURE — G9998 DOC MED RSN <3 COLON: HCPCS | Performed by: INTERNAL MEDICINE

## 2025-04-11 PROCEDURE — 88312 SPECIAL STAINS GROUP 1: CPT | Performed by: PATHOLOGY

## 2025-04-13 ENCOUNTER — TELEPHONE ENCOUNTER (OUTPATIENT)
Dept: URBAN - METROPOLITAN AREA CLINIC 7 | Facility: CLINIC | Age: 80
End: 2025-04-13

## 2025-04-13 RX ORDER — PANTOPRAZOLE SODIUM 40 MG/1
1 TABLET TABLET, DELAYED RELEASE ORAL ONCE A DAY
Qty: 30 | Refills: 3 | OUTPATIENT
Start: 2025-04-13

## 2025-04-17 ENCOUNTER — TELEPHONE ENCOUNTER (OUTPATIENT)
Dept: URBAN - METROPOLITAN AREA CLINIC 7 | Facility: CLINIC | Age: 80
End: 2025-04-17

## 2025-06-09 ENCOUNTER — OFFICE VISIT (OUTPATIENT)
Dept: URBAN - METROPOLITAN AREA CLINIC 7 | Facility: CLINIC | Age: 80
End: 2025-06-09
Payer: MEDICARE

## 2025-06-09 DIAGNOSIS — Z86.0100 PERSONAL HISTORY OF COLONIC POLYPS: ICD-10-CM

## 2025-06-09 DIAGNOSIS — K21.9 GASTROESOPHAGEAL REFLUX DISEASE: ICD-10-CM

## 2025-06-09 DIAGNOSIS — Z87.11 H/O PEPTIC ULCER: ICD-10-CM

## 2025-06-09 DIAGNOSIS — K44.9 HIATAL HERNIA: ICD-10-CM

## 2025-06-09 PROCEDURE — 99214 OFFICE O/P EST MOD 30 MIN: CPT | Performed by: INTERNAL MEDICINE

## 2025-06-09 RX ORDER — PANTOPRAZOLE SODIUM 20 MG/1
1 TABLET TABLET, DELAYED RELEASE ORAL ONCE A DAY
Qty: 90 TABLET | Refills: 3 | OUTPATIENT
Start: 2025-06-09

## 2025-06-09 RX ORDER — PANTOPRAZOLE SODIUM 40 MG/1
1 TABLET TABLET, DELAYED RELEASE ORAL ONCE A DAY
Qty: 90 TABLET | Refills: 3 | Status: ACTIVE | COMMUNITY

## 2025-06-09 NOTE — HPI-TODAY'S VISIT:
LV 2/2025. Patient has been previously evaluated for symptoms of chronic reflux and acidic symptoms.  He gets symptoms after drinking coffee every morning but has not wanted to curtail his coffee use.  He drinks 4 to 6 cups of coffee per day.  He does have a personal history of colonic polyps and I requested a surveillance colonoscopy.  He also requested an EGD at the same time but reduction in coffee intake was suggested prior to doing an EGD with [facility].  Colonoscopy March 2024 which I performed demonstrated 14 colonic polyps with a patent end-to-end colocolonic anastomosis at 15 cm proximal to the dentate line, mild diverticulosis, small nonbleeding internal hemorrhoids with pathology demonstrating at least 11 tubular adenomas.  Last EGD was done in January 2023 which demonstrated a benign-appearing esophageal stenosis which was dilated, LA grade A esophagitis, gastritis, and normal duodenum with biopsies negative for H. pylori and negative for Lugo's esophagus.  Labs more recently in June 2024 demonstrated a creatinine 0.2, normal LFTs, A1c 5.4%, and normal hemoglobin of 13.2.  Abdominal ultrasound June 2022 demonstrated fatty liver and status post cholecystectomy.  After discussion with the patient at the time of his colonoscopy he had deferred an upper endoscopy.  Follow-up now. Has had issues with chronic reflux, acid issues for 30+ years. Last EGD 1/2023. Takes pantoprazole in the AM, famotidine at night. This regimen has helped him as far as controlling symptoms. Not having severe pain or symptoms. No dysphagia, no vomiting, no alarm symptoms. Doing better at this point on current medical regimen, so will continue pantoprazole and famotidine. WIll be due for colon next year given 14 polyps removed on last colon. Tells me he has seen cardiology and no testing ordered. He does not take PPI or H2RA daily, just sometimes as needed at night (maybe couple times per week of the famotidine at night). He does get heartburn, and this is why he takes the med. Today its his most severe day. Plan was to arrange for colonoscopy for surveillance purposes, 11 adenomas last year. Will also do EGD with dilation. Place back on pantoprazole 40 mg daily for 4 weeks, and then off again to use famotidine as needed.  Colonoscopy April 2025 demonstrated 3 small colonic polyps as well as a patent end-to-side  colocolonic anastomosis at 20 cm proximal to the dentate line characterized by healthy-appearing mucosa.  EGD April 2025 demonstrated a small 2 cm sliding hiatal hernia, regular Z-line with mild esophagitis, dilation performed to 51 French, and moderate erosive gastropathy throughout the gastric body and a few small duodenal erosions.  Pathology demonstrated negative for H. pylori, negative for Lugo's esophagus, and only 1 tubular adenoma.  No repeat colonoscopy was recommended.  He was continued on pantoprazole 40 mg daily for at least 6 weeks and then famotidine 40 mg twice daily. He has been taking pantoprazole in the AM, cut back on coffee, similar eating habits otherwise. Doing well overall since his procedures.